# Patient Record
(demographics unavailable — no encounter records)

---

## 2025-02-12 NOTE — ASSESSMENT
[FreeTextEntry1] : HCM - pt presenting to establish care and for CPE - EKG done in office NSR at 82 - declines flu shot today (previous reaction); unsure if she has gotten tdap since her last pregnancy 15 years ago - pt would like to check her records and let us know - pt due for colonoscopy - tried to go in 2023 however had difficulty obtaining an appt - pt will make appt with GI - routine labs, f/u results  BP elevated - BP elevated in office today 130/92 on repeat - patient has BP cuff at home, will check at least 3x/week at home and keep a log - f/u in 1 month for BP check - if remains elevated will start on low dose medication (amlodipine 5mg)  Thyroid disease - pt with hx thyroid issue 10-12 years ago on medication but unsure of name; unsure if this was hyper or hypothyroidism - will check TSH, free T4 and total T3 - f/u results  Right hip pain  - only when pressure on the area when sleeping  - will order x-ray

## 2025-02-12 NOTE — HISTORY OF PRESENT ILLNESS
[FreeTextEntry1] : Establish care/CPE [de-identified] : Pt is a 49 y/o F with PMHx thyroid issue who presents for physical.  Pt has last seen a doctor in 2022. Pt had not needed to see any specialists and had not been hospitalized since then. Only medical history is a thyroid issue for which she was briefly on medication 10 years ago, unsure if this was hyper or hypo thyroidism.  Oct 2023 last OB/GYN visit with pap done which was normal.  In october patient had checked her BP and noted that it was a little high, otherwise she has been feeling well and has no other concerns. Has lost about 8lbs due to lifestyle modification. Pt does occasionally get slight headache/migraine associated with her menstrual cycle. Periods previously regular, however recently with slightly longer cycles. LMP last week.  No other concerns. NKDA, not on any medications, no surgical hx. Pt has not yet had colonoscopy.  Reports right ant pain when sleeping prone. Does not experience the pain otherwise. Has tried to apply oil to the area - but no change in pain when laying on the area.

## 2025-02-12 NOTE — HEALTH RISK ASSESSMENT
[No] : In the past 12 months have you used drugs other than those required for medical reasons? No [0] : 2) Feeling down, depressed, or hopeless: Not at all (0) [PHQ-2 Negative - No further assessment needed] : PHQ-2 Negative - No further assessment needed [Patient reported mammogram was normal] : Patient reported mammogram was normal [Patient reported PAP Smear was normal] : Patient reported PAP Smear was normal [With Family] : lives with family [] :  [Feels Safe at Home] : Feels safe at home [Never] : Never [de-identified] : 3 days/weeek walking or other form of exercise [de-identified] : last 3 months diet has been good - proteins, vegetables, fruits and has been cutting down on sugar [[id number]] : 0 [Reports changes in hearing] : Reports no changes in hearing [Reports changes in vision] : Reports no changes in vision [MammogramComments] : at least 2 years ago [PapSmearDate] : 10/23

## 2025-02-12 NOTE — PHYSICAL EXAM
[No Acute Distress] : no acute distress [Well Nourished] : well nourished [Well Developed] : well developed [Well-Appearing] : well-appearing [Normal Sclera/Conjunctiva] : normal sclera/conjunctiva [EOMI] : extraocular movements intact [Normal Outer Ear/Nose] : the outer ears and nose were normal in appearance [Normal Oropharynx] : the oropharynx was normal [Normal TMs] : both tympanic membranes were normal [Supple] : supple [Thyroid Normal, No Nodules] : the thyroid was normal and there were no nodules present [No Respiratory Distress] : no respiratory distress  [No Accessory Muscle Use] : no accessory muscle use [Clear to Auscultation] : lungs were clear to auscultation bilaterally [Normal Rate] : normal rate  [Regular Rhythm] : with a regular rhythm [Normal S1, S2] : normal S1 and S2 [No Murmur] : no murmur heard [No Edema] : there was no peripheral edema [No Extremity Clubbing/Cyanosis] : no extremity clubbing/cyanosis [Soft] : abdomen soft [Non Tender] : non-tender [Non-distended] : non-distended [Normal Bowel Sounds] : normal bowel sounds [No Focal Deficits] : no focal deficits [Normal Affect] : the affect was normal [Normal Insight/Judgement] : insight and judgment were intact